# Patient Record
Sex: FEMALE | Race: WHITE | NOT HISPANIC OR LATINO | Employment: UNEMPLOYED | URBAN - NONMETROPOLITAN AREA
[De-identification: names, ages, dates, MRNs, and addresses within clinical notes are randomized per-mention and may not be internally consistent; named-entity substitution may affect disease eponyms.]

---

## 2017-02-14 ENCOUNTER — OFFICE VISIT (OUTPATIENT)
Dept: CARDIOLOGY | Facility: CLINIC | Age: 77
End: 2017-02-14

## 2017-02-14 VITALS
WEIGHT: 260 LBS | HEIGHT: 66 IN | DIASTOLIC BLOOD PRESSURE: 70 MMHG | HEART RATE: 60 BPM | SYSTOLIC BLOOD PRESSURE: 136 MMHG | BODY MASS INDEX: 41.78 KG/M2

## 2017-02-14 DIAGNOSIS — E78.00 PURE HYPERCHOLESTEROLEMIA: ICD-10-CM

## 2017-02-14 DIAGNOSIS — I10 ESSENTIAL HYPERTENSION: ICD-10-CM

## 2017-02-14 DIAGNOSIS — E66.01 OBESITY, CLASS III, BMI 40-49.9 (MORBID OBESITY) (HCC): ICD-10-CM

## 2017-02-14 DIAGNOSIS — I25.10 CORONARY ARTERY DISEASE INVOLVING NATIVE CORONARY ARTERY OF NATIVE HEART WITHOUT ANGINA PECTORIS: Primary | ICD-10-CM

## 2017-02-14 PROCEDURE — 99213 OFFICE O/P EST LOW 20 MIN: CPT | Performed by: NURSE PRACTITIONER

## 2017-02-14 RX ORDER — ISOSORBIDE MONONITRATE 60 MG/1
60 TABLET, EXTENDED RELEASE ORAL DAILY
COMMUNITY
End: 2017-03-06 | Stop reason: SDUPTHER

## 2017-02-14 RX ORDER — HYDRALAZINE HYDROCHLORIDE 50 MG/1
50 TABLET, FILM COATED ORAL 3 TIMES DAILY
COMMUNITY
End: 2018-02-19 | Stop reason: ALTCHOICE

## 2017-02-14 RX ORDER — PROMETHAZINE HYDROCHLORIDE 25 MG/1
25 TABLET ORAL EVERY 6 HOURS PRN
COMMUNITY
End: 2018-08-23 | Stop reason: ALTCHOICE

## 2017-02-14 RX ORDER — FUROSEMIDE 20 MG/1
20 TABLET ORAL DAILY
COMMUNITY
End: 2018-02-19 | Stop reason: SDUPTHER

## 2017-02-14 RX ORDER — MELATONIN 10 MG
TABLET, SUBLINGUAL SUBLINGUAL DAILY
COMMUNITY
End: 2018-08-23 | Stop reason: DRUGHIGH

## 2017-02-14 RX ORDER — CARVEDILOL 12.5 MG/1
12.5 TABLET ORAL 2 TIMES DAILY WITH MEALS
COMMUNITY
End: 2018-02-19 | Stop reason: SDUPTHER

## 2017-02-14 NOTE — PROGRESS NOTES
Chief Complaint   Patient presents with   • Follow-up     She states has stopped taking Atorvastatin due to dizziness, leg pain, and severe swelling. She states once she stopped taking Atorvastatin her chest pain also stopped. She reports B/P has been elevated at last 2 MD appointments.   • Shortness of Breath     With exertion. She reports that the only movement she gets is walking around the house.       Subjective       Arminda Bautista is a 76 y.o. female  with a history of chest pain and shortness of breath who had abnormal stress in the past. She continued to have cardiac symptoms, and in 2014, cath was done. The cath showed non-critical CAD with recommendation for medical management. At her last office visit the dose of Coreg was decreased due to bradycardia. Norvasc was changed to Procardia due to swelling. Today she comes to the office for a follow up appointment and medication changes noted. Her blood pressure and heart rate today are at goal. She did have concern over aortic valve after reading an article about symptoms of aortic valvular disease.     HPI         Cardiac History:    Past Surgical History   Procedure Laterality Date   • Cholecystectomy     • Hysterectomy       seconday to uterine cancer   • Ct upper ext unilateral w wo contrast  09/05/2012     CT Chest- Coronary Calcification.   • Us carotid unilateral  11/16/2012     Carotid US- Normal   • Echo - converted  04/26/2013     Echo- EF 65-70%. RVSP 47 mmHg   • Cardiovascular stress test  04/26/2013     L. Myoview- InferoLateral Ischemia, Imdur added   • Cath lab procedure  11/11/2014     Cardiac Cath- EF 60%. non-critical disease 3 vessels, Ranexa and Lipitor added   • Echo - converted  08/11/2016     EF > 65%, mild to mod MR       Current Outpatient Prescriptions   Medication Sig Dispense Refill   • aspirin 81 MG EC tablet Take 81 mg by mouth daily.     • carvedilol (COREG) 12.5 MG tablet Take 12.5 mg by mouth 2 (Two) Times a Day With Meals.      • Cholecalciferol (VITAMIN D3) 42567 UNITS tablet Take  by mouth Daily.     • furosemide (LASIX) 20 MG tablet Take 20 mg by mouth Daily.     • hydrALAZINE (APRESOLINE) 50 MG tablet Take 50 mg by mouth 2 (Two) Times a Day.     • isosorbide mononitrate (IMDUR) 60 MG 24 hr tablet Take 60 mg by mouth Daily.     • levothyroxine (SYNTHROID, LEVOTHROID) 112 MCG tablet Take 112 mcg by mouth daily.     • losartan (COZAAR) 100 MG tablet Take 100 mg by mouth daily.     • Multiple Vitamin (MULTI VITAMIN DAILY PO) Take  by mouth.     • promethazine (PHENERGAN) 25 MG tablet Take 25 mg by mouth Every 6 (Six) Hours As Needed for nausea or vomiting.       No current facility-administered medications for this visit.        Tetanus toxoids    Past Medical History   Diagnosis Date   • Diabetes mellitus    • DJD (degenerative joint disease)    • H/O bilateral mastectomy      secondary to Breast cancer   • Heel spur      removed   • History of cataract surgery    • History of cholecystectomy    • History of hysterectomy      secondary to uterine cancer   • Hypercholesteremia    • Hypertension    • Hypothyroidism    • TIA (transient ischemic attack)        Social History     Social History   • Marital status:      Spouse name: N/A   • Number of children: N/A   • Years of education: N/A     Occupational History   • Not on file.     Social History Main Topics   • Smoking status: Former Smoker     Quit date:    • Smokeless tobacco: Never Used   • Alcohol use No   • Drug use: No   • Sexual activity: Not on file     Other Topics Concern   • Not on file     Social History Narrative       Family History   Problem Relation Age of Onset   • Cancer Mother      Lung   • Heart attack Father      First MI at 53;  at 65 with MI   • Stroke Father 55       Review of Systems   Constitutional: Positive for fatigue. Negative for appetite change.   HENT: Negative.    Respiratory: Positive for shortness of breath (with minimal exertion,  "attributes to sedentary life style).    Cardiovascular: Positive for leg swelling. Negative for chest pain and palpitations.   Genitourinary: Negative.    Musculoskeletal: Positive for arthralgias, back pain and myalgias.   Neurological: Negative.    Hematological: Negative.    Psychiatric/Behavioral: Positive for sleep disturbance (wake up to urinate at night). Negative for confusion.       Diabetes- Yes  Thyroid-abnormal    Objective     Visit Vitals   • /70 (BP Location: Left arm)   • Pulse 60   • Ht 66\" (167.6 cm)   • Wt 260 lb (118 kg)   • BMI 41.97 kg/m2       Physical Exam   Constitutional: She is oriented to person, place, and time. Vital signs are normal.   Eyes: Pupils are equal, round, and reactive to light.   Neck: Neck supple. No JVD present. Carotid bruit is not present.   Cardiovascular: Normal rate, regular rhythm, S1 normal and S2 normal.    Murmur heard.   Systolic murmur is present with a grade of 2/6   Pulses:       Radial pulses are 3+ on the right side, and 3+ on the left side.   Pulmonary/Chest: Effort normal and breath sounds normal.   Abdominal: Soft. Bowel sounds are normal.   Neurological: She is alert and oriented to person, place, and time.   Skin: Skin is warm and dry.   Psychiatric: She has a normal mood and affect. Her behavior is normal. Judgment and thought content normal.   Vitals reviewed.    Procedures        Assessment/Plan      Arminda was seen today for follow-up and shortness of breath.    Diagnoses and all orders for this visit:    Coronary artery disease involving native coronary artery of native heart without angina pectoris    Essential hypertension    Pure hypercholesterolemia    Obesity, Class III, BMI 40-49.9 (morbid obesity)        She follows with you for management of labs. It is noted she stopped atrovastatin and she reports improvement of multiple symptoms. I did not restart any lipid lowering agent at this time. I advised her to discuss with you at next " visit.   She mentions elevation of blood pressure a few reading which seemed to be in the morning. If she has morning hypertension then will consider overnight oxygen monitor. The report of her echocardiogram done last year was reviewed which was stable.  We discussed diet and weight loss. No medication changes made today. We will see her back in 6 months or sooner for problems.            Electronically signed by ADAM Amor,  February 14, 2017 3:02 PM

## 2017-03-06 RX ORDER — ISOSORBIDE MONONITRATE 60 MG/1
TABLET, EXTENDED RELEASE ORAL
Qty: 30 TABLET | Refills: 6 | Status: SHIPPED | OUTPATIENT
Start: 2017-03-06 | End: 2017-10-06 | Stop reason: SDUPTHER

## 2017-07-10 RX ORDER — CARVEDILOL 12.5 MG/1
TABLET ORAL
Qty: 180 TABLET | Refills: 0 | Status: SHIPPED | OUTPATIENT
Start: 2017-07-10 | End: 2017-08-17 | Stop reason: SDUPTHER

## 2017-08-17 ENCOUNTER — OFFICE VISIT (OUTPATIENT)
Dept: CARDIOLOGY | Facility: CLINIC | Age: 77
End: 2017-08-17

## 2017-08-17 VITALS
DIASTOLIC BLOOD PRESSURE: 60 MMHG | HEART RATE: 56 BPM | SYSTOLIC BLOOD PRESSURE: 140 MMHG | HEIGHT: 66 IN | WEIGHT: 262 LBS | BODY MASS INDEX: 42.11 KG/M2

## 2017-08-17 DIAGNOSIS — N28.9 RENAL INSUFFICIENCY: ICD-10-CM

## 2017-08-17 DIAGNOSIS — R06.01 ORTHOPNEA: ICD-10-CM

## 2017-08-17 DIAGNOSIS — E78.49 OTHER HYPERLIPIDEMIA: ICD-10-CM

## 2017-08-17 DIAGNOSIS — I25.10 CORONARY ARTERY DISEASE INVOLVING NATIVE CORONARY ARTERY OF NATIVE HEART WITHOUT ANGINA PECTORIS: Primary | ICD-10-CM

## 2017-08-17 DIAGNOSIS — R63.5 WEIGHT GAIN: ICD-10-CM

## 2017-08-17 DIAGNOSIS — R60.0 LOCALIZED EDEMA: ICD-10-CM

## 2017-08-17 DIAGNOSIS — E03.8 OTHER SPECIFIED HYPOTHYROIDISM: ICD-10-CM

## 2017-08-17 DIAGNOSIS — I10 ESSENTIAL HYPERTENSION: ICD-10-CM

## 2017-08-17 PROCEDURE — 99214 OFFICE O/P EST MOD 30 MIN: CPT | Performed by: NURSE PRACTITIONER

## 2017-08-17 NOTE — PROGRESS NOTES
Chief Complaint   Patient presents with   • Follow-up     She states yesterday she had light pressure to the left chest, she states this is new for her. She states PCP increased dose of medication due to dizziness yet unable to recall medication and she did not bring medication. She is to call back with current medication list. She reports PCP writes refills on medication. She reports last labs in May.   • Shortness of Breath     She states has gotten worse, she states now having while sitting watching TV and with minimal exertion.       Cardiac Complaints  dyspnea      Subjective   Arminda Bautista is a 76 y.o. female with a history of chest pain and shortness of breath who had abnormal stress in the past. She continued to have cardiac symptoms, and in 2014, cath was done. The cath showed non-critical CAD with recommendation for medical management. Last year the dose of Coreg was decreased due to bradycardia and norvasc was changed to Procardia due to swelling. She returns today for follow up and reports more shortness of breath than prior.  She says with any exertion she will become very short of breath and even at exertion she will now feel winded.  She says it has been hard for her to lay down as she will become winded.  She continues to have bilateral edema but states this is no worse than prior. She denies any chest pain or palpitations. She says her lasix therapy was recently cut in half by PCP but she is unsure why. She says labs were done with PCP in May. No refills are currently needed      Cardiac History  Past Surgical History:   Procedure Laterality Date   • CARDIOVASCULAR STRESS TEST  04/26/2013    LFalguni Myoview- InferoLateral Ischemia, Imdur added   • CATH LAB PROCEDURE  11/11/2014    Cardiac Cath- EF 60%. non-critical disease 3 vessels, Ranexa and Lipitor added   • CHOLECYSTECTOMY     • CT UPPER EXT UNILATERAL W WO CONTRAST  09/05/2012    CT Chest- Coronary Calcification.   • ECHO - CONVERTED  04/26/2013     Echo- EF 65-70%. RVSP 47 mmHg   • ECHO - CONVERTED  08/11/2016    EF > 65%, mild to mod MR   • HYSTERECTOMY      seconday to uterine cancer   • US CAROTID UNILATERAL  11/16/2012    Carotid US- Normal       Current Outpatient Prescriptions   Medication Sig Dispense Refill   • aspirin 81 MG EC tablet Take 81 mg by mouth daily.     • Acetaminophen (ARTHRITIS PAIN RELIEVER PO) Take 650 mg by mouth Daily. Two tablets at night     • carvedilol (COREG) 12.5 MG tablet Take 12.5 mg by mouth 2 (Two) Times a Day With Meals.     • Cholecalciferol (VITAMIN D3) 33728 UNITS tablet Take  by mouth Daily.     • furosemide (LASIX) 20 MG tablet Take 20 mg by mouth Daily.     • hydrALAZINE (APRESOLINE) 50 MG tablet Take 50 mg by mouth 3 (Three) Times a Day. 1 tablet in am and 2 tablets in the pm     • isosorbide mononitrate (IMDUR) 60 MG 24 hr tablet TAKE ONE TABLET BY MOUTH ONCE DAILY 30 tablet 6   • levothyroxine (SYNTHROID, LEVOTHROID) 112 MCG tablet Take 112 mcg by mouth daily.     • losartan (COZAAR) 100 MG tablet Take 100 mg by mouth daily.     • Multiple Vitamin (MULTI VITAMIN DAILY PO) Take  by mouth.     • promethazine (PHENERGAN) 25 MG tablet Take 25 mg by mouth Every 6 (Six) Hours As Needed for nausea or vomiting.       No current facility-administered medications for this visit.        Tetanus toxoids    Past Medical History:   Diagnosis Date   • Diabetes mellitus    • DJD (degenerative joint disease)    • H/O bilateral mastectomy     secondary to Breast cancer   • Heel spur     removed   • History of cataract surgery    • History of cholecystectomy    • History of hysterectomy     secondary to uterine cancer   • Hypercholesteremia    • Hypertension    • Hypothyroidism    • TIA (transient ischemic attack)        Social History     Social History   • Marital status:      Spouse name: N/A   • Number of children: N/A   • Years of education: N/A     Occupational History   • Not on file.     Social History Main Topics  "  • Smoking status: Former Smoker     Quit date:    • Smokeless tobacco: Never Used   • Alcohol use No   • Drug use: No   • Sexual activity: Not on file     Other Topics Concern   • Not on file     Social History Narrative       Family History   Problem Relation Age of Onset   • Cancer Mother      Lung   • Heart attack Father      First MI at 53;  at 65 with MI   • Stroke Father 55       Review of Systems   Constitution: Positive for malaise/fatigue. Negative for weakness.   Cardiovascular: Positive for dyspnea on exertion and orthopnea. Negative for chest pain, irregular heartbeat and syncope.   Respiratory: Positive for shortness of breath. Negative for wheezing.    Gastrointestinal: Negative for anorexia, heartburn and nausea.   Genitourinary: Negative for dysuria, hesitancy and nocturia.   Neurological: Negative for dizziness, focal weakness and light-headedness.   Psychiatric/Behavioral: Negative for altered mental status and depression.       DiabetesNo  Thyroidabnormal    Objective     /60 (BP Location: Right arm)  Pulse 56  Ht 66\" (167.6 cm)  Wt 262 lb (119 kg)  BMI 42.29 kg/m2    Physical Exam   Constitutional: She is oriented to person, place, and time. She appears well-developed and well-nourished.   HENT:   Head: Normocephalic and atraumatic.   Eyes: EOM are normal. Pupils are equal, round, and reactive to light.   Neck: Normal range of motion. Neck supple.   Cardiovascular: Normal rate and regular rhythm.    Pulmonary/Chest: Effort normal and breath sounds normal.   Abdominal: Soft.   Musculoskeletal: Normal range of motion. She exhibits edema.   Neurological: She is alert and oriented to person, place, and time.   Skin: Skin is warm and dry.   Psychiatric: She has a normal mood and affect. Her behavior is normal.       Procedures    Assessment/Plan     HR is slightly bradycardic but she is asymptomatic with this and is most likely a result of coreg therapy.  BP is stable.  Since she " is having more shortness of breath and orthopnea, we will encourage on stress and echo to look for any ischemic burden, any valvular concerns, or any diastolic dysfunction/effusion that may be causing problems.  More recommendations to follow.  Labs were recently done with you in May, no copies are available to me.  We will advise on repeat labs with CMP, CBC, and BNP to see if any abnormalities are present that may be attributing factors.  Weight is up another two pounds since last appointment.  She admits to not being very strict on her diet and we talked about about limiting her sodium intake as well as caloric intake.  No refills are currently needed as they are done with your office and no current med list is available.  She will get a list to our office.  6 month follow up will be advised or sooner if needed, patient advised to call with concerns.      Problems Addressed this Visit        Cardiovascular and Mediastinum    CAD (coronary artery disease) - Primary    Relevant Orders    Stress Test With Myocardial Perfusion One Day    Adult Transthoracic Echo Complete    Hyperlipidemia    HTN (hypertension)       Endocrine    Hypothyroid      Other Visit Diagnoses     Weight gain        Relevant Orders    Stress Test With Myocardial Perfusion One Day    Adult Transthoracic Echo Complete    CBC & Differential    Comprehensive Metabolic Panel    proBNP    Orthopnea        Relevant Orders    Stress Test With Myocardial Perfusion One Day    Adult Transthoracic Echo Complete    CBC & Differential    Comprehensive Metabolic Panel    proBNP    Localized edema        Relevant Orders    Stress Test With Myocardial Perfusion One Day    Adult Transthoracic Echo Complete    CBC & Differential    Comprehensive Metabolic Panel    proBNP                Electronically signed by ADAM Olvera August 17, 2017 5:08 PM

## 2017-08-17 NOTE — PATIENT INSTRUCTIONS
"DASH Eating Plan  DASH stands for \"Dietary Approaches to Stop Hypertension.\" The DASH eating plan is a healthy eating plan that has been shown to reduce high blood pressure (hypertension). Additional health benefits may include reducing the risk of type 2 diabetes mellitus, heart disease, and stroke. The DASH eating plan may also help with weight loss.  WHAT DO I NEED TO KNOW ABOUT THE DASH EATING PLAN?  For the DASH eating plan, you will follow these general guidelines:  · Choose foods with less than 150 milligrams of sodium per serving (as listed on the food label).  · Use salt-free seasonings or herbs instead of table salt or sea salt.  · Check with your health care provider or pharmacist before using salt substitutes.  · Eat lower-sodium products. These are often labeled as \"low-sodium\" or \"no salt added.\"  · Eat fresh foods. Avoid eating a lot of canned foods.  · Eat more vegetables, fruits, and low-fat dairy products.  · Choose whole grains. Look for the word \"whole\" as the first word in the ingredient list.  · Choose fish and skinless chicken or turkey more often than red meat. Limit fish, poultry, and meat to 6 oz (170 g) each day.  · Limit sweets, desserts, sugars, and sugary drinks.  · Choose heart-healthy fats.  · Eat more home-cooked food and less restaurant, buffet, and fast food.  · Limit fried foods.  · Do not ortiz foods. Cook foods using methods such as baking, boiling, grilling, and broiling instead.  · When eating at a restaurant, ask that your food be prepared with less salt, or no salt if possible.  WHAT FOODS CAN I EAT?  Seek help from a dietitian for individual calorie needs.  Grains  Whole grain or whole wheat bread. Brown rice. Whole grain or whole wheat pasta. Quinoa, bulgur, and whole grain cereals. Low-sodium cereals. Corn or whole wheat flour tortillas. Whole grain cornbread. Whole grain crackers. Low-sodium crackers.  Vegetables  Fresh or frozen vegetables (raw, steamed, roasted, or " grilled). Low-sodium or reduced-sodium tomato and vegetable juices. Low-sodium or reduced-sodium tomato sauce and paste. Low-sodium or reduced-sodium canned vegetables.   Fruits  All fresh, canned (in natural juice), or frozen fruits.  Meat and Other Protein Products  Ground beef (85% or leaner), grass-fed beef, or beef trimmed of fat. Skinless chicken or turkey. Ground chicken or turkey. Pork trimmed of fat. All fish and seafood. Eggs. Dried beans, peas, or lentils. Unsalted nuts and seeds. Unsalted canned beans.  Dairy  Low-fat dairy products, such as skim or 1% milk, 2% or reduced-fat cheeses, low-fat ricotta or cottage cheese, or plain low-fat yogurt. Low-sodium or reduced-sodium cheeses.  Fats and Oils  Tub margarines without trans fats. Light or reduced-fat mayonnaise and salad dressings (reduced sodium). Avocado. Safflower, olive, or canola oils. Natural peanut or almond butter.  Other  Unsalted popcorn and pretzels.  The items listed above may not be a complete list of recommended foods or beverages. Contact your dietitian for more options.  WHAT FOODS ARE NOT RECOMMENDED?  Grains  White bread. White pasta. White rice. Refined cornbread. Bagels and croissants. Crackers that contain trans fat.  Vegetables  Creamed or fried vegetables. Vegetables in a cheese sauce. Regular canned vegetables. Regular canned tomato sauce and paste. Regular tomato and vegetable juices.  Fruits  Canned fruit in light or heavy syrup. Fruit juice.  Meat and Other Protein Products  Fatty cuts of meat. Ribs, chicken wings, leyva, sausage, bologna, salami, chitterlings, fatback, hot dogs, bratwurst, and packaged luncheon meats. Salted nuts and seeds. Canned beans with salt.  Dairy  Whole or 2% milk, cream, half-and-half, and cream cheese. Whole-fat or sweetened yogurt. Full-fat cheeses or blue cheese. Nondairy creamers and whipped toppings. Processed cheese, cheese spreads, or cheese curds.  Condiments  Onion and garlic salt, seasoned  salt, table salt, and sea salt. Canned and packaged gravies. Worcestershire sauce. Tartar sauce. Barbecue sauce. Teriyaki sauce. Soy sauce, including reduced sodium. Steak sauce. Fish sauce. Oyster sauce. Cocktail sauce. Horseradish. Ketchup and mustard. Meat flavorings and tenderizers. Bouillon cubes. Hot sauce. Tabasco sauce. Marinades. Taco seasonings. Relishes.  Fats and Oils  Butter, stick margarine, lard, shortening, ghee, and leyva fat. Coconut, palm kernel, or palm oils. Regular salad dressings.  Other  Pickles and olives. Salted popcorn and pretzels.  The items listed above may not be a complete list of foods and beverages to avoid. Contact your dietitian for more information.  WHERE CAN I FIND MORE INFORMATION?  National [employer], [employer], [employer] [place]: www.nhlbi.nih.gov/health/health-topics/topics/dash/     This information is not intended to replace advice given to you by your health care provider. Make sure you discuss any questions you have with your health care provider.     Document Released: 12/06/2012 Document Revised: 04/10/2017 Document Reviewed: 10/22/2014  Elsevier Interactive Patient Education ©2017 [employer]

## 2017-08-28 ENCOUNTER — HOSPITAL ENCOUNTER (OUTPATIENT)
Dept: CARDIOLOGY | Facility: HOSPITAL | Age: 77
Discharge: HOME | End: 2017-08-28

## 2017-08-28 ENCOUNTER — OUTSIDE FACILITY SERVICE (OUTPATIENT)
Dept: CARDIOLOGY | Facility: CLINIC | Age: 77
End: 2017-08-28

## 2017-08-28 DIAGNOSIS — I25.10 CORONARY ARTERY DISEASE INVOLVING NATIVE CORONARY ARTERY OF NATIVE HEART WITHOUT ANGINA PECTORIS: ICD-10-CM

## 2017-08-28 DIAGNOSIS — R06.01 ORTHOPNEA: ICD-10-CM

## 2017-08-28 DIAGNOSIS — R60.0 LOCALIZED EDEMA: ICD-10-CM

## 2017-08-28 DIAGNOSIS — R63.5 WEIGHT GAIN: ICD-10-CM

## 2017-08-28 LAB
MAXIMAL PREDICTED HEART RATE: 142 BPM
STRESS TARGET HR: 121 BPM

## 2017-08-28 PROCEDURE — 78452 HT MUSCLE IMAGE SPECT MULT: CPT

## 2017-08-28 PROCEDURE — A9500 TC99M SESTAMIBI: HCPCS | Performed by: INTERNAL MEDICINE

## 2017-08-28 PROCEDURE — 93306 TTE W/DOPPLER COMPLETE: CPT | Performed by: INTERNAL MEDICINE

## 2017-08-28 PROCEDURE — 93017 CV STRESS TEST TRACING ONLY: CPT

## 2017-08-28 PROCEDURE — 93306 TTE W/DOPPLER COMPLETE: CPT

## 2017-08-28 PROCEDURE — 0 TECHNETIUM SESTAMIBI: Performed by: INTERNAL MEDICINE

## 2017-08-28 PROCEDURE — 78452 HT MUSCLE IMAGE SPECT MULT: CPT | Performed by: INTERNAL MEDICINE

## 2017-08-28 PROCEDURE — 25010000002 REGADENOSON 0.4 MG/5ML SOLUTION: Performed by: INTERNAL MEDICINE

## 2017-08-28 PROCEDURE — 93018 CV STRESS TEST I&R ONLY: CPT | Performed by: INTERNAL MEDICINE

## 2017-08-28 RX ADMIN — TECHNETIUM TC-99M SESTAMIBI 1 DOSE: 1 INJECTION INTRAVENOUS at 09:45

## 2017-08-28 RX ADMIN — REGADENOSON 0.4 MG: 0.08 INJECTION, SOLUTION INTRAVENOUS at 09:45

## 2017-08-30 ENCOUNTER — TELEPHONE (OUTPATIENT)
Dept: CARDIOLOGY | Facility: CLINIC | Age: 77
End: 2017-08-30

## 2017-08-30 NOTE — TELEPHONE ENCOUNTER
Patient aware of stress test and echo results and recommendations.  Negative for ischemia, normal LV function, Continue home medications.

## 2017-10-09 RX ORDER — ISOSORBIDE MONONITRATE 60 MG/1
TABLET, EXTENDED RELEASE ORAL
Qty: 30 TABLET | Refills: 6 | Status: SHIPPED | OUTPATIENT
Start: 2017-10-09 | End: 2018-02-19 | Stop reason: SDUPTHER

## 2017-10-17 RX ORDER — CARVEDILOL 12.5 MG/1
TABLET ORAL
Qty: 180 TABLET | Refills: 1 | Status: SHIPPED | OUTPATIENT
Start: 2017-10-17 | End: 2018-02-19 | Stop reason: SDUPTHER

## 2018-02-19 ENCOUNTER — OFFICE VISIT (OUTPATIENT)
Dept: CARDIOLOGY | Facility: CLINIC | Age: 78
End: 2018-02-19

## 2018-02-19 VITALS
HEIGHT: 66 IN | DIASTOLIC BLOOD PRESSURE: 60 MMHG | WEIGHT: 261 LBS | SYSTOLIC BLOOD PRESSURE: 108 MMHG | BODY MASS INDEX: 41.95 KG/M2 | HEART RATE: 56 BPM

## 2018-02-19 DIAGNOSIS — E78.49 OTHER HYPERLIPIDEMIA: ICD-10-CM

## 2018-02-19 DIAGNOSIS — I10 ESSENTIAL HYPERTENSION: ICD-10-CM

## 2018-02-19 DIAGNOSIS — E03.8 OTHER SPECIFIED HYPOTHYROIDISM: ICD-10-CM

## 2018-02-19 DIAGNOSIS — I25.10 CORONARY ARTERY DISEASE INVOLVING NATIVE CORONARY ARTERY OF NATIVE HEART WITHOUT ANGINA PECTORIS: Primary | ICD-10-CM

## 2018-02-19 PROCEDURE — 99214 OFFICE O/P EST MOD 30 MIN: CPT | Performed by: NURSE PRACTITIONER

## 2018-02-19 RX ORDER — ISOSORBIDE MONONITRATE 60 MG/1
60 TABLET, EXTENDED RELEASE ORAL DAILY
Qty: 90 TABLET | Refills: 3 | Status: SHIPPED | OUTPATIENT
Start: 2018-02-19 | End: 2019-02-18 | Stop reason: SDUPTHER

## 2018-02-19 RX ORDER — LOSARTAN POTASSIUM 100 MG/1
100 TABLET ORAL DAILY
Qty: 90 TABLET | Refills: 3 | Status: SHIPPED | OUTPATIENT
Start: 2018-02-19 | End: 2019-02-18 | Stop reason: SDUPTHER

## 2018-02-19 RX ORDER — CARVEDILOL 12.5 MG/1
12.5 TABLET ORAL 2 TIMES DAILY WITH MEALS
Qty: 180 TABLET | Refills: 3 | Status: SHIPPED | OUTPATIENT
Start: 2018-02-19 | End: 2019-02-18 | Stop reason: SDUPTHER

## 2018-02-19 RX ORDER — FUROSEMIDE 20 MG/1
20 TABLET ORAL DAILY
Qty: 90 TABLET | Refills: 3 | Status: SHIPPED | OUTPATIENT
Start: 2018-02-19 | End: 2018-08-20 | Stop reason: DRUGHIGH

## 2018-02-19 RX ORDER — ATORVASTATIN CALCIUM 20 MG/1
20 TABLET, FILM COATED ORAL EVERY OTHER DAY
COMMUNITY
End: 2018-02-19 | Stop reason: SDUPTHER

## 2018-02-19 RX ORDER — ATORVASTATIN CALCIUM 20 MG/1
20 TABLET, FILM COATED ORAL EVERY OTHER DAY
Qty: 90 TABLET | Refills: 3 | Status: SHIPPED | OUTPATIENT
Start: 2018-02-19 | End: 2018-03-01 | Stop reason: DRUGHIGH

## 2018-02-19 RX ORDER — ASPIRIN 81 MG/1
81 TABLET ORAL DAILY
Qty: 90 TABLET | Refills: 3 | Status: SHIPPED | OUTPATIENT
Start: 2018-02-19

## 2018-02-19 NOTE — PROGRESS NOTES
"Chief Complaint   Patient presents with   • Follow-up     For cardiac management. Last labs in chart.    • Chest Pain     She reports chest pressure to left and mid chest. She reports having tightness and shooting pains to left side of neck.   • Shortness of Breath     She states \"breathing hard\".   • Med Refill     Needs refills on cardiac medication-90day.       Subjective       Arminda Bautista is a 77 y.o. female with a history of hypertension whose cardiac work up in the past secondary to chest pain and shortness of breath has shown non-critical disease.  Her last stress test on 8/28/2017 showed normal LV function and no ischemia.  Echocardiogram with mild LVH, mild MR, and LVEF 65%.  Medical management has been recommended.  Her medications have been adjusted with Coreg decreased secondary to bradycardia and Norvasc changed to Procardia due to swelling.  She came in today for her regular follow up visit.  She denies any chest pain or palpitations.  She does have some shortness of breath but no change from before.  She does have pain in her left neck with turning head to the left.  Labs checked in August showed BNP unremarkable.  BUN/Cr 37/1.5.  Lasix had been reduced just prior.  She reports no labs since then.        HPI         Cardiac History:    Past Surgical History:   Procedure Laterality Date   • CARDIAC CATHETERIZATION  11/11/2014    Cardiac Cath- EF 60%. non-critical disease 3 vessels, Ranexa and Lipitor added   • CARDIOVASCULAR STRESS TEST  04/26/2013    L. Myoview- InferoLateral Ischemia, Imdur added   • CARDIOVASCULAR STRESS TEST  08/28/2017    Lexiscan- no ischemia, EF >65%   • CT UPPER EXT UNILATERAL W WO CONTRAST  09/05/2012    CT Chest- Coronary Calcification.   • ECHO - CONVERTED  04/26/2013    Echo- EF 65-70%. RVSP 47 mmHg   • ECHO - CONVERTED  08/11/2016    EF > 65%, mild to mod MR   • ECHO - CONVERTED  08/28/2017    EF >65%, mild MR, RVSP 34 mmHg   • US CAROTID UNILATERAL  11/16/2012    " Carotid US- Normal       Current Outpatient Prescriptions   Medication Sig Dispense Refill   • Acetaminophen (ARTHRITIS PAIN RELIEVER PO) Take 650 mg by mouth Daily As Needed.     • aspirin 81 MG EC tablet Take 1 tablet by mouth Daily. 90 tablet 3   • atorvastatin (LIPITOR) 20 MG tablet Take 1 tablet by mouth Every Other Day. 90 tablet 3   • carvedilol (COREG) 12.5 MG tablet Take 1 tablet by mouth 2 (Two) Times a Day With Meals. 180 tablet 3   • Cholecalciferol (VITAMIN D3) 16580 UNITS tablet Take  by mouth Daily.     • furosemide (LASIX) 20 MG tablet Take 1 tablet by mouth Daily. 90 tablet 3   • isosorbide mononitrate (IMDUR) 60 MG 24 hr tablet Take 1 tablet by mouth Daily. 90 tablet 3   • levothyroxine (SYNTHROID, LEVOTHROID) 112 MCG tablet Take 112 mcg by mouth daily.     • losartan (COZAAR) 100 MG tablet Take 1 tablet by mouth Daily. 90 tablet 3   • Multiple Vitamin (MULTI VITAMIN DAILY PO) Take  by mouth.     • promethazine (PHENERGAN) 25 MG tablet Take 25 mg by mouth Every 6 (Six) Hours As Needed for nausea or vomiting.       No current facility-administered medications for this visit.        Tetanus toxoids    Past Medical History:   Diagnosis Date   • Diabetes mellitus    • DJD (degenerative joint disease)    • H/O bilateral mastectomy     secondary to Breast cancer   • Heel spur     removed   • History of cataract surgery    • History of cholecystectomy    • History of hysterectomy     secondary to uterine cancer   • Hypercholesteremia    • Hypertension    • Hypothyroidism    • TIA (transient ischemic attack)        Social History     Social History   • Marital status:      Spouse name: N/A   • Number of children: N/A   • Years of education: N/A     Occupational History   • Not on file.     Social History Main Topics   • Smoking status: Former Smoker     Quit date: 1963   • Smokeless tobacco: Never Used   • Alcohol use No   • Drug use: No   • Sexual activity: Not on file     Other Topics Concern   •  "Not on file     Social History Narrative       Family History   Problem Relation Age of Onset   • Cancer Mother      Lung   • Heart attack Father      First MI at 53;  at 65 with MI   • Stroke Father 55       Review of Systems   Constitution: Positive for weakness. Negative for decreased appetite and weight gain.   HENT: Negative.    Cardiovascular: Positive for dyspnea on exertion and leg swelling. Negative for chest pain, near-syncope, orthopnea, palpitations and syncope.   Respiratory: Positive for shortness of breath. Negative for cough.    Endocrine: Negative.    Hematologic/Lymphatic: Negative.    Musculoskeletal: Positive for arthritis, joint pain (knees) and neck pain (left neck ). Negative for falls (none recent ).   Gastrointestinal: Negative for abdominal pain, melena and nausea.   Genitourinary: Negative for dysuria and hematuria.   Neurological: Negative for dizziness and headaches.   Psychiatric/Behavioral: Negative for altered mental status and depression.   Allergic/Immunologic: Negative.         Diabetes- No  Thyroid-normal    Objective     /60 (BP Location: Left arm)  Pulse 56  Ht 167.6 cm (65.98\")  Wt 118 kg (261 lb)  BMI 42.15 kg/m2    Physical Exam   Constitutional: She is oriented to person, place, and time. She appears well-developed and well-nourished.   HENT:   Head: Normocephalic and atraumatic.   Eyes: Pupils are equal, round, and reactive to light.   Neck: Normal range of motion.   Cardiovascular: Normal rate, regular rhythm and intact distal pulses.    Murmur heard.  Pulmonary/Chest: Effort normal and breath sounds normal.   Abdominal: Soft. Bowel sounds are normal.   Musculoskeletal: Normal range of motion. She exhibits edema (1+ bilateral).   Neurological: She is alert and oriented to person, place, and time.   Skin: Skin is warm and dry.   Psychiatric: She has a normal mood and affect.      Procedures          Assessment/Plan    Heart rate and blood pressure stable.  I " explained to her the findings of her recent stress and echocardiogram.  Upon reviewing medication list, she is no longer taking amlodipine or nifedipine and she is not sure why.  Her blood pressure is stable today, so no changes made.  Refills sent for cardiac meds.  She was given a lab order to check CBC, CMP, lipids, and TSH.  Copy will be forwarded to you.  We discussed her renal insufficiency.  She can continue low dose Lasix.  She was encouraged to increase water intake.  She has difficulty with walking secondary to knee and hip pain, but was encouraged to move as much as possible.  Body mass index is 42.15 kg/(m^2).  We discussed different strategies for weight reduction.  We will see her back in six months or sooner if any new or worsening symptoms develop.     Arminda was seen today for follow-up, chest pain, shortness of breath and med refill.    Diagnoses and all orders for this visit:    Coronary artery disease involving native coronary artery of native heart without angina pectoris  -     Comprehensive Metabolic Panel; Future  -     Lipid Panel; Future  -     CBC & Differential; Future    Other hyperlipidemia  -     Comprehensive Metabolic Panel; Future  -     Lipid Panel; Future  -     TSH; Future    Essential hypertension  -     Comprehensive Metabolic Panel; Future  -     TSH; Future  -     CBC & Differential; Future    Other specified hypothyroidism  -     Comprehensive Metabolic Panel; Future  -     Lipid Panel; Future  -     TSH; Future    Other orders  -     atorvastatin (LIPITOR) 20 MG tablet; Take 1 tablet by mouth Every Other Day.  -     carvedilol (COREG) 12.5 MG tablet; Take 1 tablet by mouth 2 (Two) Times a Day With Meals.  -     aspirin 81 MG EC tablet; Take 1 tablet by mouth Daily.  -     furosemide (LASIX) 20 MG tablet; Take 1 tablet by mouth Daily.  -     isosorbide mononitrate (IMDUR) 60 MG 24 hr tablet; Take 1 tablet by mouth Daily.  -     losartan (COZAAR) 100 MG tablet; Take 1 tablet  by mouth Daily.                    Electronically signed by ADAM Anne,  February 19, 2018 9:00 PM

## 2018-03-01 RX ORDER — ATORVASTATIN CALCIUM 20 MG/1
20 TABLET, FILM COATED ORAL DAILY
Qty: 90 TABLET | Refills: 1 | Status: SHIPPED | OUTPATIENT
Start: 2018-03-01 | End: 2018-08-23 | Stop reason: DRUGHIGH

## 2018-08-20 ENCOUNTER — OFFICE VISIT (OUTPATIENT)
Dept: CARDIOLOGY | Facility: CLINIC | Age: 78
End: 2018-08-20

## 2018-08-20 VITALS
SYSTOLIC BLOOD PRESSURE: 110 MMHG | HEART RATE: 56 BPM | DIASTOLIC BLOOD PRESSURE: 60 MMHG | HEIGHT: 66 IN | BODY MASS INDEX: 42.75 KG/M2 | WEIGHT: 266 LBS

## 2018-08-20 DIAGNOSIS — R60.0 LEG EDEMA: ICD-10-CM

## 2018-08-20 DIAGNOSIS — I25.10 CORONARY ARTERY DISEASE INVOLVING NATIVE CORONARY ARTERY OF NATIVE HEART WITHOUT ANGINA PECTORIS: Primary | ICD-10-CM

## 2018-08-20 DIAGNOSIS — E03.8 OTHER SPECIFIED HYPOTHYROIDISM: ICD-10-CM

## 2018-08-20 DIAGNOSIS — I10 ESSENTIAL HYPERTENSION: ICD-10-CM

## 2018-08-20 DIAGNOSIS — E78.49 OTHER HYPERLIPIDEMIA: ICD-10-CM

## 2018-08-20 PROCEDURE — 99213 OFFICE O/P EST LOW 20 MIN: CPT | Performed by: NURSE PRACTITIONER

## 2018-08-20 RX ORDER — FUROSEMIDE 40 MG/1
40 TABLET ORAL 2 TIMES WEEKLY
COMMUNITY
End: 2019-02-18 | Stop reason: SDUPTHER

## 2018-08-20 RX ORDER — HYDRALAZINE HYDROCHLORIDE 50 MG/1
50 TABLET, FILM COATED ORAL 2 TIMES DAILY
COMMUNITY
End: 2019-02-18 | Stop reason: SDUPTHER

## 2018-08-20 NOTE — PROGRESS NOTES
"Chief Complaint   Patient presents with   • Follow-up     Cardiac management. Last labs 2-3 weeks ago per PCP, PCP changed cholesterol medication and changed dose of Lasix. Patient had old list of medication, will update and bring new list back to office.   • Edema     She reports having more edema to feet, legs and ankles.   • Shortness of Breath     She reports about the same.       Subjective       Arminda Bautista is a 77 y.o. female with a history of hypertension whose cardiac work up in the past secondary to chest pain and shortness of breath has shown non-critical disease.  Her last stress test on 8/28/2017 showed normal LV function and no ischemia.  Echocardiogram with mild LVH, mild MR, and LVEF 65%.  Medical management has been recommended. BP meds have been adjusted and CCB were stopped secondary to LE edema. She is now on carvedilol, hydralazine, losartan, and low dose furosemide for swelling. She came in today for her regular follow up visit. She did not bring updated med list and reports some changes with cholesterol meds and Lasix reduced. She is having difficulty with her memory and she is quite sure what she is taking. She denies any chest pain or palpitations.  She does have some shortness of breath but no change from before.  She complains of continued LE edema, cannot wear compression stockings and does admit to sitting a lot without elevating legs. Labs 2/2018 showed cholesterol elevated at 211, Tri 146, HDL 39, and . Upon questioning, she was not taking Lipitor and was advised to restart. BUN/Cr were elevated similar to previous at 40/1.6, H/H 12/37. TSH 13.6. She thinks Dr. Teresa adjusted levothyroxine, but \"can't remember\".  Labs per Dr. Teresa checked two weeks ago and apparently cholesterol medication was changed. She plans to let us know. She does recall carotid US done per Dr. Teresa and reported as normal.     HPI         Cardiac History:    Past Surgical History:   Procedure Laterality " Date   • CARDIAC CATHETERIZATION  11/11/2014    Cardiac Cath- EF 60%. non-critical disease 3 vessels, Ranexa and Lipitor added   • CARDIOVASCULAR STRESS TEST  04/26/2013    L. Myoview- InferoLateral Ischemia, Imdur added   • CARDIOVASCULAR STRESS TEST  08/28/2017    Lexiscan- no ischemia, EF >65%   • CT UPPER EXT UNILATERAL W WO CONTRAST  09/05/2012    CT Chest- Coronary Calcification.   • ECHO - CONVERTED  04/26/2013    Echo- EF 65-70%. RVSP 47 mmHg   • ECHO - CONVERTED  08/11/2016    EF > 65%, mild to mod MR   • ECHO - CONVERTED  08/28/2017    EF >65%, mild MR, RVSP 34 mmHg   • US CAROTID UNILATERAL  11/16/2012    Carotid US- Normal       Current Outpatient Prescriptions   Medication Sig Dispense Refill   • furosemide (LASIX) 40 MG tablet Alternates whole tablet every other day with 1/2 tablet every other day     • Acetaminophen (ARTHRITIS PAIN RELIEVER PO) Take 650 mg by mouth Daily As Needed.     • aspirin 81 MG EC tablet Take 1 tablet by mouth Daily. 90 tablet 3   • atorvastatin (LIPITOR) 20 MG tablet Take 1 tablet by mouth Daily. 90 tablet 1   • carvedilol (COREG) 12.5 MG tablet Take 1 tablet by mouth 2 (Two) Times a Day With Meals. 180 tablet 3   • Cholecalciferol (VITAMIN D3) 06282 UNITS tablet Take  by mouth Daily.     • hydrALAZINE (APRESOLINE) 50 MG tablet Take 50 mg by mouth 3 (Three) Times a Day.     • isosorbide mononitrate (IMDUR) 60 MG 24 hr tablet Take 1 tablet by mouth Daily. 90 tablet 3   • levothyroxine (SYNTHROID, LEVOTHROID) 112 MCG tablet Take 112 mcg by mouth daily.     • losartan (COZAAR) 100 MG tablet Take 1 tablet by mouth Daily. 90 tablet 3   • Multiple Vitamin (MULTI VITAMIN DAILY PO) Take  by mouth.     • promethazine (PHENERGAN) 25 MG tablet Take 25 mg by mouth Every 6 (Six) Hours As Needed for nausea or vomiting.       No current facility-administered medications for this visit.        Tetanus toxoids    Past Medical History:   Diagnosis Date   • Diabetes mellitus (CMS/HCC)    • DJD  "(degenerative joint disease)    • H/O bilateral mastectomy     secondary to Breast cancer   • Heel spur     removed   • History of cataract surgery    • History of cholecystectomy    • History of hysterectomy     secondary to uterine cancer   • Hypercholesteremia    • Hypertension    • Hypothyroidism    • TIA (transient ischemic attack)        Social History     Social History   • Marital status:      Spouse name: N/A   • Number of children: N/A   • Years of education: N/A     Occupational History   • Not on file.     Social History Main Topics   • Smoking status: Former Smoker     Quit date:    • Smokeless tobacco: Never Used   • Alcohol use No   • Drug use: No   • Sexual activity: Not on file     Other Topics Concern   • Not on file     Social History Narrative   • No narrative on file       Family History   Problem Relation Age of Onset   • Cancer Mother         Lung   • Heart attack Father         First MI at 53;  at 65 with MI   • Stroke Father 55       Review of Systems   Constitution: Negative for weakness.   HENT: Negative.    Eyes: Negative.    Cardiovascular: Positive for leg swelling. Negative for chest pain, dyspnea on exertion, palpitations and syncope.   Respiratory: Negative for cough and shortness of breath.    Hematologic/Lymphatic: Negative.    Skin: Negative.    Musculoskeletal: Positive for arthritis.   Gastrointestinal: Negative for abdominal pain and melena.   Genitourinary: Negative for dysuria and hematuria.   Neurological: Positive for dizziness (occasionally ) and vertigo.   Psychiatric/Behavioral: Positive for memory loss. Negative for altered mental status and depression.   Allergic/Immunologic: Negative.       Diabetes- Yes  Thyroid-normal    Objective     /60 (BP Location: Left arm)   Pulse 56   Ht 167.6 cm (65.98\")   Wt 121 kg (266 lb)   BMI 42.95 kg/m²     Physical Exam   Constitutional: She is oriented to person, place, and time. She appears well-developed " and well-nourished. No distress.   HENT:   Head: Normocephalic and atraumatic.   Eyes: Pupils are equal, round, and reactive to light.   Neck: Normal range of motion.   Cardiovascular: Normal rate, regular rhythm and intact distal pulses.    Murmur heard.  Pulmonary/Chest: Effort normal and breath sounds normal. No respiratory distress.   Abdominal: Soft. Bowel sounds are normal.   Musculoskeletal: Normal range of motion. She exhibits edema (2+ on RLE, 1+ LLE).   Neurological: She is alert and oriented to person, place, and time.   Skin: Skin is warm and dry. She is not diaphoretic.   Psychiatric: She has a normal mood and affect.   Nursing note and vitals reviewed.     Procedures          Assessment/Plan    Heart rate and blood pressure stable. We reviewed most recent cardiac work up form August 2017 which showed no ischemia, normal LV function and no significant valvular concerns other than mild MR. Regarding the LE edema, I reinforced conservative measures including compression hose, limiting sodium intake, elevating legs while sitting, avoiding prolonged sitting without moving, regular intervals of walking. I explained to her about the renal function. Adequate water hydration. Lasix use as directed per Dr. Teresa. Her BMI is significantly elevated. We discussed overall calorie reduction and choosing heart healthy foods, vegetables and fruits. Lipids have been elevated. I am not sure how compliant she is with her medications. She does feel the statin has been changed and will report. Thyroid has been managed with Dr. Teresa. No changes made today. No testing ordered since she remains asymptomatic. We will see her back in six months or sooner if needed.   Arminda was seen today for follow-up, edema and shortness of breath.    Diagnoses and all orders for this visit:    Coronary artery disease involving native coronary artery of native heart without angina pectoris    Other hyperlipidemia    Essential  hypertension    Other specified hypothyroidism    Leg edema        Patient's Body mass index is 42.95 kg/m². BMI is above normal parameters. Recommendations include: nutrition counseling.                 Electronically signed by ADAM Anne,  August 21, 2018 10:28 AM

## 2018-08-23 RX ORDER — OMEGA-3S/DHA/EPA/FISH OIL/D3 300MG-1000
400 CAPSULE ORAL DAILY
COMMUNITY
End: 2019-02-18 | Stop reason: DRUGHIGH

## 2018-08-23 RX ORDER — ATORVASTATIN CALCIUM 10 MG/1
10 TABLET, FILM COATED ORAL DAILY
COMMUNITY
End: 2019-02-18 | Stop reason: SDUPTHER

## 2018-08-23 RX ORDER — LEVOTHYROXINE SODIUM 0.12 MG/1
125 TABLET ORAL DAILY
COMMUNITY

## 2019-02-18 ENCOUNTER — OFFICE VISIT (OUTPATIENT)
Dept: CARDIOLOGY | Facility: CLINIC | Age: 79
End: 2019-02-18

## 2019-02-18 VITALS
WEIGHT: 255 LBS | HEART RATE: 56 BPM | SYSTOLIC BLOOD PRESSURE: 140 MMHG | DIASTOLIC BLOOD PRESSURE: 70 MMHG | HEIGHT: 66 IN | BODY MASS INDEX: 40.98 KG/M2

## 2019-02-18 DIAGNOSIS — R60.9 EDEMA, UNSPECIFIED TYPE: ICD-10-CM

## 2019-02-18 DIAGNOSIS — I10 ESSENTIAL HYPERTENSION: Primary | ICD-10-CM

## 2019-02-18 DIAGNOSIS — E66.01 CLASS 3 SEVERE OBESITY WITH SERIOUS COMORBIDITY AND BODY MASS INDEX (BMI) OF 40.0 TO 44.9 IN ADULT, UNSPECIFIED OBESITY TYPE (HCC): ICD-10-CM

## 2019-02-18 DIAGNOSIS — E78.49 OTHER HYPERLIPIDEMIA: ICD-10-CM

## 2019-02-18 DIAGNOSIS — I25.10 CORONARY ARTERY DISEASE INVOLVING NATIVE CORONARY ARTERY OF NATIVE HEART WITHOUT ANGINA PECTORIS: ICD-10-CM

## 2019-02-18 DIAGNOSIS — R06.02 SHORTNESS OF BREATH: ICD-10-CM

## 2019-02-18 PROCEDURE — 99214 OFFICE O/P EST MOD 30 MIN: CPT | Performed by: NURSE PRACTITIONER

## 2019-02-18 RX ORDER — CARVEDILOL 12.5 MG/1
12.5 TABLET ORAL 2 TIMES DAILY WITH MEALS
Qty: 60 TABLET | Refills: 8 | Status: SHIPPED | OUTPATIENT
Start: 2019-02-18 | End: 2019-08-20 | Stop reason: SDUPTHER

## 2019-02-18 RX ORDER — CHOLESTYRAMINE 4 G/9G
1 POWDER, FOR SUSPENSION ORAL DAILY
COMMUNITY
End: 2019-08-20 | Stop reason: ALTCHOICE

## 2019-02-18 RX ORDER — CHOLECALCIFEROL (VITAMIN D3) 125 MCG
CAPSULE ORAL DAILY
COMMUNITY

## 2019-02-18 RX ORDER — ISOSORBIDE MONONITRATE 60 MG/1
60 TABLET, EXTENDED RELEASE ORAL DAILY
Qty: 90 TABLET | Refills: 3 | Status: SHIPPED | OUTPATIENT
Start: 2019-02-18 | End: 2019-08-20 | Stop reason: SDUPTHER

## 2019-02-18 RX ORDER — HYDRALAZINE HYDROCHLORIDE 50 MG/1
50 TABLET, FILM COATED ORAL 2 TIMES DAILY
Qty: 90 TABLET | Refills: 8 | Status: SHIPPED | OUTPATIENT
Start: 2019-02-18 | End: 2019-08-20 | Stop reason: SDUPTHER

## 2019-02-18 RX ORDER — LOSARTAN POTASSIUM 100 MG/1
100 TABLET ORAL DAILY
Qty: 30 TABLET | Refills: 8 | Status: SHIPPED | OUTPATIENT
Start: 2019-02-18 | End: 2019-08-20 | Stop reason: SDUPTHER

## 2019-02-18 RX ORDER — FUROSEMIDE 40 MG/1
40 TABLET ORAL 2 TIMES WEEKLY
Qty: 30 TABLET | Refills: 8 | Status: SHIPPED | OUTPATIENT
Start: 2019-02-18 | End: 2019-08-20 | Stop reason: SDUPTHER

## 2019-02-18 RX ORDER — ATORVASTATIN CALCIUM 10 MG/1
10 TABLET, FILM COATED ORAL DAILY
Qty: 30 TABLET | Refills: 8 | Status: SHIPPED | OUTPATIENT
Start: 2019-02-18 | End: 2019-08-20 | Stop reason: SDUPTHER

## 2019-02-18 NOTE — PROGRESS NOTES
Chief Complaint   Patient presents with   • Follow-up     Cardiac management. B/P has been fluctuating for the last week. Last labs 6 months ago per PCP.   • Chest Pain     Has some sharp pains mid to left chest that are short in duration. She has had 2 episodes that pain under left and right arm has woke her from sleep.   • Shortness of Breath     Has had increase with any exertion for the last 6 months.   • Dizziness     Started in the last 6 months, having random dizziness.   • Med Refill     Needs refills on cardiac medication-30 day.       Subjective       Arminda Bautista is an 78 y.o. female with a history of hypertension whose cardiac work up in the past secondary to chest pain and shortness of breath has shown non-critical disease.  Stress test on 8/28/2017 showed normal LV function and no ischemia. Echocardiogram with mild LVH, mild MR, and LVEF 65%. Medical management has been recommended. BP meds have been adjusted and CCB were stopped secondary to LE edema. She is now on carvedilol, hydralazine, losartan, and low dose furosemide for swelling. She came in today for her regular follow up visit. Last visit, she did not bring her medicine and was confused about what she was taking. She continued to c/o leg swelling. No changes were made. Today she brought her medication bottles and meds confirmed. Hydralazine prescribed 50 mg TID, but she couldn't remember mid-day dose. Changed to 50 mg in am 100 mg in pm, but she has only taken 50 mg at night. BP monitored at home ranges 140-180 systolic. She denies chest pain. SOB on mild to moderate exertion. She takes Lasix twice weekly for LE edema which is chronic. Labs in 2/2018 showed H/H 12/37, BUN/Cr 1.6, GFR 34, , Tri 146, HDL 39, , TSH 13.8. Advised to take her statin more regularly. Her labs have been rechecked since then with Dr. Teresa with plans for more in near future.    HPI         Cardiac History:    Past Surgical History:   Procedure Laterality  Date   • CARDIAC CATHETERIZATION  11/11/2014    Cardiac Cath- EF 60%. non-critical disease 3 vessels, Ranexa and Lipitor added   • CARDIOVASCULAR STRESS TEST  04/26/2013    L. Myoview- InferoLateral Ischemia, Imdur added   • CARDIOVASCULAR STRESS TEST  08/28/2017    Lexiscan- no ischemia, EF >65%   • CT UPPER EXT UNILATERAL W WO CONTRAST  09/05/2012    CT Chest- Coronary Calcification.   • ECHO - CONVERTED  04/26/2013    Echo- EF 65-70%. RVSP 47 mmHg   • ECHO - CONVERTED  08/11/2016    EF > 65%, mild to mod MR   • ECHO - CONVERTED  08/28/2017    EF >65%, mild MR, RVSP 34 mmHg   • US CAROTID UNILATERAL  11/16/2012    Carotid US- Normal       Current Outpatient Medications   Medication Sig Dispense Refill   • aspirin 81 MG EC tablet Take 1 tablet by mouth Daily. 90 tablet 3   • atorvastatin (LIPITOR) 10 MG tablet Take 1 tablet by mouth Daily. 30 tablet 8   • carvedilol (COREG) 12.5 MG tablet Take 1 tablet by mouth 2 (Two) Times a Day With Meals. 60 tablet 8   • Cholecalciferol (VITAMIN D3) 2000 units tablet Take  by mouth Daily.     • cholestyramine (QUESTRAN) 4 g packet Take 1 packet by mouth Daily.     • furosemide (LASIX) 40 MG tablet Take 1 tablet by mouth 2 (Two) Times a Week. 30 tablet 8   • hydrALAZINE (APRESOLINE) 50 MG tablet Take 1 tablet by mouth 2 (Two) Times a Day. Take one in morning and two in evening 90 tablet 8   • isosorbide mononitrate (IMDUR) 60 MG 24 hr tablet Take 1 tablet by mouth Daily. 90 tablet 3   • levothyroxine (SYNTHROID, LEVOTHROID) 125 MCG tablet Take 125 mcg by mouth Daily.     • losartan (COZAAR) 100 MG tablet Take 1 tablet by mouth Daily. 30 tablet 8   • Multiple Vitamin (MULTI VITAMIN DAILY PO) Take  by mouth Daily.       No current facility-administered medications for this visit.        Tetanus toxoids    Past Medical History:   Diagnosis Date   • Diabetes mellitus (CMS/HCC)    • DJD (degenerative joint disease)    • H/O bilateral mastectomy     secondary to Breast cancer   • Heel  spur     removed   • History of cataract surgery    • History of cholecystectomy    • History of hysterectomy     secondary to uterine cancer   • Hypercholesteremia    • Hypertension    • Hypothyroidism    • TIA (transient ischemic attack)        Social History     Socioeconomic History   • Marital status:      Spouse name: Not on file   • Number of children: Not on file   • Years of education: Not on file   • Highest education level: Not on file   Social Needs   • Financial resource strain: Not on file   • Food insecurity - worry: Not on file   • Food insecurity - inability: Not on file   • Transportation needs - medical: Not on file   • Transportation needs - non-medical: Not on file   Occupational History   • Not on file   Tobacco Use   • Smoking status: Former Smoker     Last attempt to quit: 1963     Years since quittin.1   • Smokeless tobacco: Never Used   Substance and Sexual Activity   • Alcohol use: No   • Drug use: No   • Sexual activity: Not on file   Other Topics Concern   • Not on file   Social History Narrative   • Not on file       Family History   Problem Relation Age of Onset   • Cancer Mother         Lung   • Heart attack Father         First MI at 53;  at 65 with MI   • Stroke Father 55       Review of Systems   Constitution: Positive for weight loss (down 11 lb). Negative for decreased appetite and weakness.   HENT: Negative.    Eyes: Negative.    Cardiovascular: Positive for dyspnea on exertion and leg swelling. Negative for chest pain, orthopnea, palpitations and syncope.   Respiratory: Positive for shortness of breath. Negative for cough.    Endocrine: Negative.    Hematologic/Lymphatic: Negative.    Skin: Negative.    Musculoskeletal: Positive for arthritis, back pain and joint pain. Negative for myalgias.   Gastrointestinal: Negative for abdominal pain and melena.   Genitourinary: Negative for dysuria and hematuria.   Neurological: Negative for dizziness.  "  Psychiatric/Behavioral: Negative for altered mental status and depression.   Allergic/Immunologic: Negative.         Diabetes- No  Thyroid-normal    Objective     /70 (BP Location: Left arm)   Pulse 56   Ht 167.6 cm (65.98\")   Wt 116 kg (255 lb)   BMI 41.18 kg/m²     Physical Exam   Constitutional: She is oriented to person, place, and time. She appears well-developed and well-nourished. No distress.   HENT:   Head: Normocephalic.   Eyes: Pupils are equal, round, and reactive to light.   Cardiovascular: Normal rate, regular rhythm and intact distal pulses.   Murmur heard.   Systolic murmur is present with a grade of 2/6 at the upper right sternal border and apex.  Pulmonary/Chest: Effort normal and breath sounds normal.   Abdominal: Soft. Bowel sounds are normal.   Musculoskeletal: Normal range of motion. She exhibits edema (1+ bilateral LE edema).   Neurological: She is alert and oriented to person, place, and time.   Skin: Skin is warm and dry. She is not diaphoretic.   Psychiatric: She has a normal mood and affect.   Nursing note and vitals reviewed.     Procedures          Assessment/Plan    Heart rate and blood pressure are stable presently. She is advised to take 100 mg hydralazine at night for better systolic control as she reports elevates to 180 at times. She c/o shortness of breath and edema. Advised to use Lasix daily for one week then reduce to twice weekly as used before. Suggest repeating echo but she declines at present. She will call if symptoms persist and she wants to proceed. Strict sodium reduction encouraged. Elevate legs while sitting. She plans to have labs with Dr. Teresa at next follow up. Copy would be appreciated. Refills sent for her cardiac meds. She has lost 11 pounds since last visit, but BMI remains significantly elevated. Limiting carbohydrates will be helpful. She appears stable. We will see her back in six months or sooner if needed.     Arminda was seen today for " follow-up, chest pain, shortness of breath, dizziness and med refill.    Diagnoses and all orders for this visit:    Essential hypertension    Coronary artery disease involving native coronary artery of native heart without angina pectoris    Other hyperlipidemia    Shortness of breath    Edema, unspecified type    Class 3 severe obesity with serious comorbidity and body mass index (BMI) of 40.0 to 44.9 in adult, unspecified obesity type (CMS/HCC)    Other orders  -     carvedilol (COREG) 12.5 MG tablet; Take 1 tablet by mouth 2 (Two) Times a Day With Meals.  -     losartan (COZAAR) 100 MG tablet; Take 1 tablet by mouth Daily.  -     atorvastatin (LIPITOR) 10 MG tablet; Take 1 tablet by mouth Daily.  -     furosemide (LASIX) 40 MG tablet; Take 1 tablet by mouth 2 (Two) Times a Week.  -     isosorbide mononitrate (IMDUR) 60 MG 24 hr tablet; Take 1 tablet by mouth Daily.  -     hydrALAZINE (APRESOLINE) 50 MG tablet; Take 1 tablet by mouth 2 (Two) Times a Day. Take one in morning and two in evening        Patient's Body mass index is 41.18 kg/m². BMI is above normal parameters. Recommendations include: nutrition counseling.             Electronically signed by ADAM Anne,  February 20, 2019 7:50 PM

## 2019-02-20 PROBLEM — E66.01 CLASS 3 SEVERE OBESITY IN ADULT (HCC): Status: ACTIVE | Noted: 2019-02-20

## 2019-08-20 ENCOUNTER — OFFICE VISIT (OUTPATIENT)
Dept: CARDIOLOGY | Facility: CLINIC | Age: 79
End: 2019-08-20

## 2019-08-20 VITALS
WEIGHT: 247 LBS | HEIGHT: 66 IN | HEART RATE: 60 BPM | BODY MASS INDEX: 39.7 KG/M2 | SYSTOLIC BLOOD PRESSURE: 138 MMHG | DIASTOLIC BLOOD PRESSURE: 70 MMHG

## 2019-08-20 DIAGNOSIS — E03.8 OTHER SPECIFIED HYPOTHYROIDISM: ICD-10-CM

## 2019-08-20 DIAGNOSIS — E66.01 CLASS 3 SEVERE OBESITY WITH SERIOUS COMORBIDITY AND BODY MASS INDEX (BMI) OF 40.0 TO 44.9 IN ADULT, UNSPECIFIED OBESITY TYPE (HCC): ICD-10-CM

## 2019-08-20 DIAGNOSIS — R42 DIZZINESS: ICD-10-CM

## 2019-08-20 DIAGNOSIS — R06.00 PND (PAROXYSMAL NOCTURNAL DYSPNEA): ICD-10-CM

## 2019-08-20 DIAGNOSIS — E78.49 OTHER HYPERLIPIDEMIA: ICD-10-CM

## 2019-08-20 DIAGNOSIS — R01.1 HEART MURMUR: ICD-10-CM

## 2019-08-20 DIAGNOSIS — I10 ESSENTIAL HYPERTENSION: Primary | ICD-10-CM

## 2019-08-20 PROCEDURE — 99214 OFFICE O/P EST MOD 30 MIN: CPT | Performed by: NURSE PRACTITIONER

## 2019-08-20 RX ORDER — HYDRALAZINE HYDROCHLORIDE 50 MG/1
50 TABLET, FILM COATED ORAL 2 TIMES DAILY
Qty: 90 TABLET | Refills: 8 | Status: SHIPPED | OUTPATIENT
Start: 2019-08-20

## 2019-08-20 RX ORDER — FUROSEMIDE 40 MG/1
40 TABLET ORAL 2 TIMES WEEKLY
Qty: 30 TABLET | Refills: 8 | Status: SHIPPED | OUTPATIENT
Start: 2019-08-22 | End: 2020-10-26

## 2019-08-20 RX ORDER — ATORVASTATIN CALCIUM 10 MG/1
10 TABLET, FILM COATED ORAL DAILY
Qty: 30 TABLET | Refills: 8 | Status: SHIPPED | OUTPATIENT
Start: 2019-08-20

## 2019-08-20 RX ORDER — CARVEDILOL 12.5 MG/1
12.5 TABLET ORAL 2 TIMES DAILY WITH MEALS
Qty: 60 TABLET | Refills: 8 | Status: SHIPPED | OUTPATIENT
Start: 2019-08-20

## 2019-08-20 RX ORDER — ISOSORBIDE MONONITRATE 60 MG/1
60 TABLET, EXTENDED RELEASE ORAL DAILY
Qty: 90 TABLET | Refills: 3 | Status: SHIPPED | OUTPATIENT
Start: 2019-08-20

## 2019-08-20 RX ORDER — LOSARTAN POTASSIUM 100 MG/1
100 TABLET ORAL DAILY
Qty: 30 TABLET | Refills: 8 | Status: SHIPPED | OUTPATIENT
Start: 2019-08-20 | End: 2020-07-17

## 2019-08-20 NOTE — PROGRESS NOTES
"Chief Complaint   Patient presents with   • Follow-up     Cardiac management.   • Dizziness     Two weeks ago she woke up very dizzy, went to bathroom then went back to bed, had dizziness when she got back up. She states systolic B/P was high with the dizziness.   • Shortness of Breath     Same as before, has with exertion. She feels related to her back.   • Lab     Last labs in June per PCP.   • Med Refill     Needs refills on cardiac medication -30 day.       Subjective       Arminda Bautista is a 78 y.o. female with a history of hypertension whose cardiac work up in the past secondary to chest pain and shortness of breath has shown non-critical disease.  Stress test on 8/28/2017 showed normal LV function and no ischemia. Echocardiogram with mild LVH, mild MR, and LVEF 65%. Medical management has been recommended. BP meds have been adjusted and CCB were stopped secondary to LE edema.  At last visit echo recommended due to issues with edema and shortness of breath,  but patient declined.   Labs per PCP.    Today she comes to the office for a follow-up visit.  She denies chest pain or palpitations.  She has shortness of breath with exertion but denies being any worse than before.  Swelling of her lower legs is no worse.  She does ambulate with use of a cane and has not had recent falls.  Recently she did experience dizziness after awaking and sitting on side of bed to go the bathroom.  The dizziness felt as if \"the room was spinning\".  She then checked her blood pressure and said systolic blood pressure was \"high\".  Otherwise, her blood pressure has been stable.  She admits to sometimes waking up at night feeling a little short of breath.    HPI     Cardiac History:    Past Surgical History:   Procedure Laterality Date   • CARDIAC CATHETERIZATION  11/11/2014    Cardiac Cath- EF 60%. non-critical disease 3 vessels, Ranexa and Lipitor added   • CARDIOVASCULAR STRESS TEST  04/26/2013    L. Myoview- InferoLateral Ischemia, " Imdur added   • CARDIOVASCULAR STRESS TEST  08/28/2017    Lexiscan- no ischemia, EF >65%   • CT UPPER EXT UNILATERAL W WO CONTRAST  09/05/2012    CT Chest- Coronary Calcification.   • ECHO - CONVERTED  04/26/2013    Echo- EF 65-70%. RVSP 47 mmHg   • ECHO - CONVERTED  08/11/2016    EF > 65%, mild to mod MR   • ECHO - CONVERTED  08/28/2017    EF >65%, mild MR, RVSP 34 mmHg   • US CAROTID UNILATERAL  11/16/2012    Carotid US- Normal       Current Outpatient Medications   Medication Sig Dispense Refill   • aspirin 81 MG EC tablet Take 1 tablet by mouth Daily. 90 tablet 3   • atorvastatin (LIPITOR) 10 MG tablet Take 1 tablet by mouth Daily. 30 tablet 8   • carvedilol (COREG) 12.5 MG tablet Take 1 tablet by mouth 2 (Two) Times a Day With Meals. 60 tablet 8   • Cholecalciferol (VITAMIN D3) 2000 units tablet Take  by mouth Daily.     • [START ON 8/22/2019] furosemide (LASIX) 40 MG tablet Take 1 tablet by mouth 2 (Two) Times a Week. 30 tablet 8   • hydrALAZINE (APRESOLINE) 50 MG tablet Take 1 tablet by mouth 2 (Two) Times a Day. Take one in morning and two in evening 90 tablet 8   • isosorbide mononitrate (IMDUR) 60 MG 24 hr tablet Take 1 tablet by mouth Daily. 90 tablet 3   • levothyroxine (SYNTHROID, LEVOTHROID) 125 MCG tablet Take 125 mcg by mouth Daily.     • losartan (COZAAR) 100 MG tablet Take 1 tablet by mouth Daily. 30 tablet 8   • Multiple Vitamin (MULTI VITAMIN DAILY PO) Take  by mouth Daily.     • Psyllium (METAMUCIL FIBER PO) Take  by mouth Daily.       No current facility-administered medications for this visit.        Tetanus toxoids    Past Medical History:   Diagnosis Date   • Diabetes mellitus (CMS/HCC)    • DJD (degenerative joint disease)    • H/O bilateral mastectomy     secondary to Breast cancer   • Heel spur     removed   • History of cataract surgery    • History of cholecystectomy    • History of hysterectomy     secondary to uterine cancer   • Hypercholesteremia    • Hypertension    • Hypothyroidism  "   • TIA (transient ischemic attack)        Social History     Socioeconomic History   • Marital status:      Spouse name: Not on file   • Number of children: Not on file   • Years of education: Not on file   • Highest education level: Not on file   Tobacco Use   • Smoking status: Former Smoker     Last attempt to quit: 1963     Years since quittin.6   • Smokeless tobacco: Never Used   Substance and Sexual Activity   • Alcohol use: No   • Drug use: No       Family History   Problem Relation Age of Onset   • Cancer Mother         Lung   • Heart attack Father         First MI at 53;  at 65 with MI   • Stroke Father 55       Review of Systems   Constitution: Positive for malaise/fatigue (same). Negative for decreased appetite.   HENT: Negative for congestion, ear discharge, ear pain and hoarse voice.    Eyes: Negative for redness and visual disturbance.   Cardiovascular: Positive for leg swelling (same, not a new problem). Negative for chest pain and near-syncope.   Respiratory: Positive for shortness of breath and sleep disturbances due to breathing (\"not often\"). Negative for cough.    Endocrine: Positive for cold intolerance and heat intolerance.   Hematologic/Lymphatic: Negative for bleeding problem. Does not bruise/bleed easily.   Skin: Negative for dry skin, flushing and itching.   Musculoskeletal: Positive for arthritis, joint pain and stiffness. Negative for falls and muscle cramps.   Gastrointestinal: Negative for change in bowel habit, heartburn, melena and nausea.   Genitourinary: Positive for nocturia. Negative for dysuria and hematuria.   Neurological: Positive for dizziness. Negative for headaches, light-headedness and paresthesias.        Objective     /70 (BP Location: Right arm)   Pulse 60   Ht 167.6 cm (65.98\")   Wt 112 kg (247 lb)   BMI 39.89 kg/m²     Physical Exam   Constitutional: She is oriented to person, place, and time. Vital signs are normal. She appears " well-nourished.   HENT:   Head: Normocephalic.   Eyes: Conjunctivae are normal. Pupils are equal, round, and reactive to light.   Neck: Normal range of motion. Neck supple. Carotid bruit is not present.   Cardiovascular: Normal rate, regular rhythm, S1 normal and S2 normal.   Murmur heard.  Pulmonary/Chest: Breath sounds normal. She has no wheezes. She has no rales.   Abdominal: Soft. Bowel sounds are normal. She exhibits no distension. There is no tenderness.   Musculoskeletal: Normal range of motion. She exhibits edema (mild lower legs).   Neurological: She is alert and oriented to person, place, and time.   Skin: Skin is warm and dry. No pallor.   Psychiatric: She has a normal mood and affect. Her behavior is normal.        Procedures: none today         Assessment/Plan      Arminda was seen today for follow-up, dizziness, shortness of breath, lab and med refill.    Diagnoses and all orders for this visit:    Essential hypertension  -     losartan (COZAAR) 100 MG tablet; Take 1 tablet by mouth Daily.  -     isosorbide mononitrate (IMDUR) 60 MG 24 hr tablet; Take 1 tablet by mouth Daily.  -     hydrALAZINE (APRESOLINE) 50 MG tablet; Take 1 tablet by mouth 2 (Two) Times a Day. Take one in morning and two in evening  -     carvedilol (COREG) 12.5 MG tablet; Take 1 tablet by mouth 2 (Two) Times a Day With Meals.  -     furosemide (LASIX) 40 MG tablet; Take 1 tablet by mouth 2 (Two) Times a Week.    Class 3 severe obesity with serious comorbidity and body mass index (BMI) of 40.0 to 44.9 in adult, unspecified obesity type (CMS/HCC)    Other hyperlipidemia  -     atorvastatin (LIPITOR) 10 MG tablet; Take 1 tablet by mouth Daily.    Other specified hypothyroidism    Heart murmur    Dizziness    PND (paroxysmal nocturnal dyspnea)      Her blood pressure, heart rhythm, and heart rate today are normal.  Same cardiac medications advised and refills given.  She denies chest pain or cardiac concerns.  Slight heart murmur noted  but since she remains asymptomatic she does not want repeat echocardiogram at this time.     For lipid management she is taking Lipitor without issue.  According to patient she had labs done around May of this year.  Please forward a copy of results.  She will follow with you for further lab orders.      Patient's Body mass index is 39.89 kg/m². BMI is above normal parameters. Recommendations include: nutrition counseling.  Diet for weight loss encouraged.    Ms. Bautista does admit to recent episode of dizziness.  According to patient carotid ultrasounds in the recent past did not show stenosis.  She does describe the dizziness as a spinning type sensation.  I advised her to further discuss with you possible vertigo and management.     PND- She also admits to symptoms suggestive of nocturnal hypoxia. We discussed overnight oxymetry monitor but she declines at this time.  Should she change her mind she can call the office for order.     A 6-month follow-up visit scheduled.  Please call sooner for any cardiac concerns.           Electronically signed by ADAM Amor,  August 20, 2019 2:24 PM

## 2020-07-17 DIAGNOSIS — I10 ESSENTIAL HYPERTENSION: ICD-10-CM

## 2020-07-17 RX ORDER — LOSARTAN POTASSIUM 100 MG/1
TABLET ORAL
Qty: 90 TABLET | Refills: 0 | Status: SHIPPED | OUTPATIENT
Start: 2020-07-17 | End: 2020-10-26

## 2020-10-23 DIAGNOSIS — I10 ESSENTIAL HYPERTENSION: ICD-10-CM

## 2020-10-26 RX ORDER — LOSARTAN POTASSIUM 100 MG/1
TABLET ORAL
Qty: 90 TABLET | Refills: 0 | Status: SHIPPED | OUTPATIENT
Start: 2020-10-26

## 2020-10-26 RX ORDER — FUROSEMIDE 40 MG/1
TABLET ORAL
Qty: 26 TABLET | Refills: 0 | Status: SHIPPED | OUTPATIENT
Start: 2020-10-26

## 2021-10-15 ENCOUNTER — OUTSIDE FACILITY SERVICE (OUTPATIENT)
Dept: CARDIOLOGY | Facility: CLINIC | Age: 81
End: 2021-10-15

## 2021-10-15 PROCEDURE — 99223 1ST HOSP IP/OBS HIGH 75: CPT | Performed by: INTERNAL MEDICINE

## 2021-10-15 PROCEDURE — 93325 DOPPLER ECHO COLOR FLOW MAPG: CPT | Performed by: INTERNAL MEDICINE

## 2021-10-15 PROCEDURE — 93308 TTE F-UP OR LMTD: CPT | Performed by: INTERNAL MEDICINE

## 2021-10-15 PROCEDURE — 93321 DOPPLER ECHO F-UP/LMTD STD: CPT | Performed by: INTERNAL MEDICINE

## 2021-10-17 ENCOUNTER — OUTSIDE FACILITY SERVICE (OUTPATIENT)
Dept: CARDIOLOGY | Facility: CLINIC | Age: 81
End: 2021-10-17

## 2021-10-17 PROCEDURE — 93018 CV STRESS TEST I&R ONLY: CPT | Performed by: INTERNAL MEDICINE

## 2021-10-17 PROCEDURE — 78452 HT MUSCLE IMAGE SPECT MULT: CPT | Performed by: INTERNAL MEDICINE
